# Patient Record
Sex: MALE | Race: WHITE | NOT HISPANIC OR LATINO | Employment: UNEMPLOYED | URBAN - METROPOLITAN AREA
[De-identification: names, ages, dates, MRNs, and addresses within clinical notes are randomized per-mention and may not be internally consistent; named-entity substitution may affect disease eponyms.]

---
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
## 2025-01-13 DIAGNOSIS — G43.019 INTRACTABLE MIGRAINE WITHOUT AURA AND WITHOUT STATUS MIGRAINOSUS: ICD-10-CM

## 2025-01-13 RX ORDER — AMITRIPTYLINE HYDROCHLORIDE 75 MG/1
TABLET ORAL
Qty: 30 TABLET | Refills: 0 | OUTPATIENT
Start: 2025-01-13

## 2025-01-13 NOTE — TELEPHONE ENCOUNTER
Rx Refill Note  Requested Prescriptions     Pending Prescriptions Disp Refills    amitriptyline (ELAVIL) 75 MG tablet [Pharmacy Med Name: AMITRIPTYLINE HCL 75 MG TAB] 30 tablet 0     Sig: TAKE ONE TABLET BY MOUTH EVERY NIGHT **NEED APPOINTMENT FOR FURTHER REFILLS**      Last filled:  12/18/24 w/0  Last office visit with prescribing clinician: 12/28/2023      Next office visit with prescribing clinician: Visit date not found     Nida Canseco MA  01/13/25, 12:30 EST

## 2025-01-15 DIAGNOSIS — G43.019 INTRACTABLE MIGRAINE WITHOUT AURA AND WITHOUT STATUS MIGRAINOSUS: ICD-10-CM

## 2025-01-15 RX ORDER — AMITRIPTYLINE HYDROCHLORIDE 75 MG/1
TABLET ORAL
Qty: 30 TABLET | Refills: 0 | OUTPATIENT
Start: 2025-01-15

## 2025-01-15 NOTE — TELEPHONE ENCOUNTER
Rx Refill Note  Requested Prescriptions     Pending Prescriptions Disp Refills    amitriptyline (ELAVIL) 75 MG tablet [Pharmacy Med Name: AMITRIPTYLINE HCL 75 MG TAB] 30 tablet 0     Sig: TAKE ONE TABLET BY MOUTH EVERY NIGHT **NEED APPOINTMENT FOR FURTHER REFILLS**      Last filled: 12/18/24 w/0 and note to call to schedule for further refills  Last office visit with prescribing clinician: 12/28/2023      Next office visit with prescribing clinician: Visit date not found     Nida Canseco MA  01/15/25, 15:33 EST

## 2025-01-15 NOTE — TELEPHONE ENCOUNTER
Rx Refill Note  Requested Prescriptions     Pending Prescriptions Disp Refills    amitriptyline (ELAVIL) 75 MG tablet [Pharmacy Med Name: AMITRIPTYLINE HCL 75 MG TAB] 30 tablet 0     Sig: TAKE ONE TABLET BY MOUTH EVERY NIGHT **NEED APPOINTMENT FOR FURTHER REFILLS**      Last filled:  12/18/24 w/0 and msg to jessica an appt for further refills  Last office visit with prescribing clinician: 12/28/2023      Next office visit with prescribing clinician: Visit date not found     Nida Canseco MA  01/15/25, 08:54 EST

## 2025-02-12 ENCOUNTER — OFFICE VISIT (OUTPATIENT)
Dept: FAMILY MEDICINE CLINIC | Facility: CLINIC | Age: 20
End: 2025-02-12
Payer: COMMERCIAL

## 2025-02-12 VITALS
HEIGHT: 71 IN | OXYGEN SATURATION: 99 % | HEART RATE: 95 BPM | SYSTOLIC BLOOD PRESSURE: 126 MMHG | DIASTOLIC BLOOD PRESSURE: 80 MMHG | WEIGHT: 292 LBS | BODY MASS INDEX: 40.88 KG/M2

## 2025-02-12 DIAGNOSIS — Z13.0 SCREENING FOR DEFICIENCY ANEMIA: ICD-10-CM

## 2025-02-12 DIAGNOSIS — Z13.89 SCREENING FOR BLOOD OR PROTEIN IN URINE: ICD-10-CM

## 2025-02-12 DIAGNOSIS — E66.01 CLASS 3 SEVERE OBESITY DUE TO EXCESS CALORIES WITH BODY MASS INDEX (BMI) OF 40.0 TO 44.9 IN ADULT, UNSPECIFIED WHETHER SERIOUS COMORBIDITY PRESENT: ICD-10-CM

## 2025-02-12 DIAGNOSIS — G89.29 CHRONIC BILATERAL LOW BACK PAIN WITHOUT SCIATICA: ICD-10-CM

## 2025-02-12 DIAGNOSIS — Z13.220 SCREENING FOR HYPERLIPIDEMIA: ICD-10-CM

## 2025-02-12 DIAGNOSIS — Z02.89 MEDICATION MANAGEMENT CONTRACT AGREEMENT: ICD-10-CM

## 2025-02-12 DIAGNOSIS — E66.813 CLASS 3 SEVERE OBESITY DUE TO EXCESS CALORIES WITH BODY MASS INDEX (BMI) OF 40.0 TO 44.9 IN ADULT, UNSPECIFIED WHETHER SERIOUS COMORBIDITY PRESENT: ICD-10-CM

## 2025-02-12 DIAGNOSIS — M54.50 CHRONIC BILATERAL LOW BACK PAIN WITHOUT SCIATICA: ICD-10-CM

## 2025-02-12 DIAGNOSIS — F51.01 PRIMARY INSOMNIA: Primary | ICD-10-CM

## 2025-02-12 DIAGNOSIS — Z13.6 SCREENING FOR CARDIOVASCULAR CONDITION: ICD-10-CM

## 2025-02-12 DIAGNOSIS — Z13.29 SCREENING FOR ENDOCRINE DISORDER: ICD-10-CM

## 2025-02-12 DIAGNOSIS — Z00.00 PREVENTATIVE HEALTH CARE: ICD-10-CM

## 2025-02-12 RX ORDER — ZOLPIDEM TARTRATE 10 MG/1
10 TABLET ORAL NIGHTLY PRN
Qty: 30 TABLET | Refills: 2 | Status: SHIPPED | OUTPATIENT
Start: 2025-02-12

## 2025-02-12 NOTE — PROGRESS NOTES
Established Patient Office Visit      Patient Name: Kole Sue  : 2005   MRN: [id number]   Care Team: Patient Care Team:  Chuy Oliver DO as PCP - General (Family Medicine)  Provider, No Known as PCP - Family Medicine    Chief Complaint:    Chief Complaint   Patient presents with    Med Management       History of Present Illness: Kole Sue is a 19 y.o. male who is here today for chief complaint.    HPI    Refills on sleeping meds and migraine med    This patient is accompanied by their self who contributes to the history of their care.    The following portions of the patient's history were reviewed and updated as appropriate: allergies, current medications, past family history, past medical history, past social history, past surgical history and problem list.    Subjective      Review of Systems:   Review of Systems - See HPI    Past Medical History:   Past Medical History:   Diagnosis Date    Allergic N/A    Dairy, Tomatoes    Back pain at L4-L5 level     Depression 2018    Headache N/A    Irritable bowel syndrome 2016    Strep throat     Visual impairment     Glasses, Polar bear tracks       Past Surgical History:   Past Surgical History:   Procedure Laterality Date    CIRCUMCISION      COLONOSCOPY         Family History:   Family History   Problem Relation Age of Onset    Anxiety disorder Mother     Depression Mother     Anxiety disorder Maternal Grandmother     Arthritis Maternal Grandmother     Cancer Maternal Grandmother     Depression Maternal Grandmother     Diabetes Maternal Grandmother     Anxiety disorder Sister     Depression Sister     Anxiety disorder Sister     Depression Sister        Social History:   Social History     Socioeconomic History    Marital status: Single   Tobacco Use    Smoking status: Former     Current packs/day: 0.00     Average packs/day: 0.3 packs/day for 1 year (0.3 ttl pk-yrs)     Types: Cigarettes     Quit date: 3/15/2023     Years since quitting:  1.9     Passive exposure: Yes    Smokeless tobacco: Never   Vaping Use    Vaping status: Former    Substances: Nicotine   Substance and Sexual Activity    Alcohol use: Never    Drug use: Never    Sexual activity: Never       Tobacco History:   Social History     Tobacco Use   Smoking Status Former    Current packs/day: 0.00    Average packs/day: 0.3 packs/day for 1 year (0.3 ttl pk-yrs)    Types: Cigarettes    Quit date: 3/15/2023    Years since quittin.9    Passive exposure: Yes   Smokeless Tobacco Never       Medications:   Outpatient Medications Prior to Visit   Medication Sig Dispense Refill    amitriptyline (ELAVIL) 75 MG tablet Take 1 tablet by mouth Every Night. CALL TO SCHEDULE OFFICE VISIT FOR FURTHER REFILLS 30 tablet 0    celecoxib (CeleBREX) 100 MG capsule TAKE 1 CAPSULE BY MOUTH 2 TIMES A  capsule 0    MAGIC MOUTHWASH  SUSP (diphenhydrAMINE HCl-Aluminum & Magnesium Hydroxide-Lidocaine) Swish and spit 10 mL 3 (Three) Times a Day As Needed for Mouth Pain. 250 mL 0    omeprazole (priLOSEC) 40 MG capsule Take 1 capsule by mouth Daily. 90 capsule 3    promethazine (PHENERGAN) 25 MG tablet Take 1 tablet by mouth Every 8 (Eight) Hours As Needed for Nausea or Vomiting. 15 tablet 0    tiZANidine (ZANAFLEX) 4 MG tablet Take 2 tablets by mouth At Night As Needed for Muscle Spasms. 60 tablet 2    zolpidem (AMBIEN) 10 MG tablet TAKE ONE TABLET BY MOUTH ONCE NIGHTLY AS NEEDED FOR SLEEP 30 tablet 2    diazePAM (VALIUM) 10 MG tablet  (Patient not taking: Reported on 2025)      SUMAtriptan (IMITREX) 100 MG tablet Take one tablet at onset of headache. May repeat dose one time in 2 hours if headache not relieved. (Patient not taking: Reported on 2025) 12 tablet 2    albuterol sulfate  (90 Base) MCG/ACT inhaler Inhale 1 puff Every 6 (Six) Hours As Needed for Wheezing. (Patient not taking: Reported on 2025) 6.7 g 0    atomoxetine (STRATTERA) 60 MG capsule TAKE 1 CAPSULE BY MOUTH DAILY .  "START AFTER 40MG. HAVE LABS COMPLETED AFTER 2 MONTHS ON DOSAGE (Patient not taking: Reported on 2/12/2025) 30 capsule 2    azithromycin (Zithromax Z-Shay) 250 MG tablet Take 2 tablets by mouth on day 1, then 1 tablet daily on days 2-5 (Patient not taking: Reported on 2/12/2025) 6 tablet 0    brompheniramine-pseudoephedrine-DM 30-2-10 MG/5ML syrup Take 5 mL by mouth At Night As Needed for Allergies. (Patient not taking: Reported on 2/12/2025) 118 mL 0    predniSONE (DELTASONE) 10 MG (21) dose pack Take  by mouth Daily. Use as directed on package (Patient not taking: Reported on 2/12/2025) 21 each 0     No facility-administered medications prior to visit.        Allergies:   Allergies   Allergen Reactions    Lactose Other (See Comments)    Tomato Other (See Comments)     Canker sores, fever       Objective   Objective     Physical Exam:  Vital Signs:   Vitals:    02/12/25 1210   BP: 126/80   Pulse: 95   SpO2: 99%   Weight: 132 kg (292 lb)   Height: 180.3 cm (70.98\")     Body mass index is 40.74 kg/m².     Physical Exam  Nursing note reviewed  Const: NAD, A&Ox4, Pleasant, Cooperative  Eyes: EOMI, no conjunctivitis  ENT: No nasal discharge present, neck supple  Cardiac: Regular rate and rhythm, no cyanosis  Resp: Respiratory rate within normal limits, no increased work of breathing, no audible wheezing or retractions noted  GI: No distention or ascites  MSK: Motor and sensation grossly intact in bilateral upper extremities  Neurologic: CN II-XII grossly intact  Psych: Appropriate mood and behavior.  Skin: Warm, dry  Procedures/Radiology     Procedures  No radiology results for the last 7 days     Assessment & Plan   Assessment / Plan      Assessment/Plan:   Problems Addressed This Visit  Diagnoses and all orders for this visit:    1. Primary insomnia (Primary)    2. Medication management contract agreement  -     Compliance Drug Analysis, Ur - Urine, Clean Catch; Future  -     Compliance Drug Analysis, Ur - Urine, Clean " Catch      Problem List Items Addressed This Visit    None  Visit Diagnoses       Primary insomnia    -  Primary    Medication management contract agreement        Relevant Orders    Compliance Drug Analysis, Ur - Urine, Clean Catch            There are no Patient Instructions on file for this visit.    Follow Up:   No follow-ups on file.    DO KIERSTEN Parnell RD  [facility]  [address]  [facility] [address]  Fax [phone]  Phone [phone]    Disclaimer to patients: The 21st Century Cares Act makes medical notes like these available to patients in the interest of transparency. However, please be advised that this is still a medical document. It is intended as [id number] communication. Many sections may include medical language or jargon, abbreviations, and additional verbiage that are unfamiliar or confusing. In some ways it may come across as blunt, direct, or may be summarized in order to clearly and concisely communicate the most crucial information to medical professionals. It may also include mentions of conditions that are unlikely but considered as part of the differential diagnosis, including serious disorders. These are not always discussed at length at the time of appointment because their likelihood is so low, but may be included in a medical note to make it clear what has been considered and/or ruled out as part of a work-up. Medical documents are intended to carry relevant information, facts as evident, and the personal clinical opinion of the physician. If you have any questions regarding this medical document, please bring them to the attention of the physician at your next scheduled appointment.

## 2025-02-12 NOTE — PATIENT INSTRUCTIONS
1.  Continue medications and supplements - as listed.    2.  Continue well-balanced diet - low in calories and low in added sugar.  -Plant-based diets have the best evidence for decreasing inflammation and chronic pain, as well as reducing the risk of heart disease and dementia.    3.  Maintain a routine exercise program - 30 minutes at least 3 days every week.  This is important even if you are active at your job.

## 2025-02-16 DIAGNOSIS — M54.50 CHRONIC BILATERAL LOW BACK PAIN WITHOUT SCIATICA: ICD-10-CM

## 2025-02-16 DIAGNOSIS — G89.29 CHRONIC BILATERAL LOW BACK PAIN WITHOUT SCIATICA: ICD-10-CM

## 2025-02-17 RX ORDER — CELECOXIB 100 MG/1
100 CAPSULE ORAL 2 TIMES DAILY
Qty: 180 CAPSULE | Refills: 0 | Status: SHIPPED | OUTPATIENT
Start: 2025-02-17

## 2025-02-18 LAB — DRUGS UR: NORMAL

## 2025-03-13 DIAGNOSIS — G43.019 INTRACTABLE MIGRAINE WITHOUT AURA AND WITHOUT STATUS MIGRAINOSUS: ICD-10-CM

## 2025-03-13 RX ORDER — AMITRIPTYLINE HYDROCHLORIDE 75 MG/1
75 TABLET ORAL NIGHTLY
Qty: 30 TABLET | Refills: 0 | OUTPATIENT
Start: 2025-03-13

## 2025-03-13 NOTE — TELEPHONE ENCOUNTER
Rx Refill Note  Requested Prescriptions     Pending Prescriptions Disp Refills    amitriptyline (ELAVIL) 75 MG tablet 30 tablet 0     Sig: Take 1 tablet by mouth Every Night. CALL TO SCHEDULE OFFICE VISIT FOR FURTHER REFILLS      Last filled: 12/18/24 w/0  Last office visit with prescribing clinician: 12/28/2023      Next office visit with prescribing clinician: Visit date not found     Nida Canseco MA  03/13/25, 12:22 EDT

## 2025-03-18 ENCOUNTER — OFFICE VISIT (OUTPATIENT)
Dept: FAMILY MEDICINE CLINIC | Facility: CLINIC | Age: 20
End: 2025-03-18
Payer: COMMERCIAL

## 2025-03-18 VITALS
BODY MASS INDEX: 40.18 KG/M2 | HEIGHT: 71 IN | OXYGEN SATURATION: 97 % | DIASTOLIC BLOOD PRESSURE: 78 MMHG | SYSTOLIC BLOOD PRESSURE: 114 MMHG | HEART RATE: 102 BPM | WEIGHT: 287 LBS

## 2025-03-18 DIAGNOSIS — G43.019 INTRACTABLE MIGRAINE WITHOUT AURA AND WITHOUT STATUS MIGRAINOSUS: ICD-10-CM

## 2025-03-18 RX ORDER — AMITRIPTYLINE HYDROCHLORIDE 75 MG/1
75 TABLET ORAL NIGHTLY
Qty: 90 TABLET | Refills: 2 | Status: SHIPPED | OUTPATIENT
Start: 2025-03-18

## 2025-03-18 NOTE — LETTER
March 18, 2025     Patient: Kole Sue   YOB: 2005   Date of Visit: 3/18/2025       To Whom It May Concern:    It is my medical opinion that Kole Sue may return to work today without restrictions.         Sincerely,        Chuy Oliver DO    CC: No Recipients

## 2025-03-28 DIAGNOSIS — F51.01 PRIMARY INSOMNIA: ICD-10-CM

## 2025-03-28 RX ORDER — ZOLPIDEM TARTRATE 10 MG/1
10 TABLET ORAL NIGHTLY PRN
Qty: 30 TABLET | Refills: 2 | Status: SHIPPED | OUTPATIENT
Start: 2025-03-28

## 2025-03-28 NOTE — TELEPHONE ENCOUNTER
Rx Refill Note    Zolpidem, refill    Last office visit with prescribing clinician: 3/18/2025   Last telemedicine visit with prescribing clinician: Visit date not found   Next office visit with prescribing clinician: 5/12/2025     Cha Alfaro MA  03/28/25, 09:04 EDT

## 2025-04-01 NOTE — PROGRESS NOTES
Established Patient Office Visit      Patient Name: Kole Sue  : 2005   MRN: [id number]   Care Team: Patient Care Team:  Chuy Oliver DO as PCP - General (Family Medicine)  Provider, No Known as PCP - Family Medicine    Chief Complaint:    Chief Complaint   Patient presents with    Nausea     Pt states he felt like he had stomach flu like symptoms last Thursday. He called into work Friday and they stated he would need a note       History of Present Illness: Kole Sue is a 19 y.o. male who is here today for chief complaint.    HPI    Just needs a note saying he can return to work    This patient is accompanied by their self who contributes to the history of their care.    The following portions of the patient's history were reviewed and updated as appropriate: allergies, current medications, past family history, past medical history, past social history, past surgical history and problem list.    Subjective      Review of Systems:   Review of Systems - See HPI    Past Medical History:   Past Medical History:   Diagnosis Date    Allergic N/A    Dairy, Tomatoes    Back pain at L4-L5 level     Depression 2018    Headache N/A    Irritable bowel syndrome 2016    Strep throat     Visual impairment     Glasses, Polar bear tracks       Past Surgical History:   Past Surgical History:   Procedure Laterality Date    CIRCUMCISION      COLONOSCOPY         Family History:   Family History   Problem Relation Age of Onset    Anxiety disorder Mother     Depression Mother     Anxiety disorder Maternal Grandmother     Arthritis Maternal Grandmother     Cancer Maternal Grandmother     Depression Maternal Grandmother     Diabetes Maternal Grandmother     Anxiety disorder Sister     Depression Sister     Anxiety disorder Sister     Depression Sister        Social History:   Social History     Socioeconomic History    Marital status: Single   Tobacco Use    Smoking status: Former     Current packs/day: 0.00      Average packs/day: 0.3 packs/day for 1 year (0.3 ttl pk-yrs)     Types: Cigarettes     Quit date: 3/15/2023     Years since quittin.0     Passive exposure: Yes    Smokeless tobacco: Never   Vaping Use    Vaping status: Former    Substances: Nicotine   Substance and Sexual Activity    Alcohol use: Never    Drug use: Never    Sexual activity: Never       Tobacco History:   Social History     Tobacco Use   Smoking Status Former    Current packs/day: 0.00    Average packs/day: 0.3 packs/day for 1 year (0.3 ttl pk-yrs)    Types: Cigarettes    Quit date: 3/15/2023    Years since quittin.0    Passive exposure: Yes   Smokeless Tobacco Never       Medications:   Outpatient Medications Prior to Visit   Medication Sig Dispense Refill    celecoxib (CeleBREX) 100 MG capsule TAKE 1 CAPSULE BY MOUTH 2 TIMES A  capsule 0    MAGIC MOUTHWASH  SUSP (diphenhydrAMINE HCl-Aluminum & Magnesium Hydroxide-Lidocaine) Swish and spit 10 mL 3 (Three) Times a Day As Needed for Mouth Pain. 250 mL 0    omeprazole (priLOSEC) 40 MG capsule Take 1 capsule by mouth Daily. 90 capsule 3    promethazine (PHENERGAN) 25 MG tablet Take 1 tablet by mouth Every 8 (Eight) Hours As Needed for Nausea or Vomiting. 15 tablet 0    tiZANidine (ZANAFLEX) 4 MG tablet Take 2 tablets by mouth At Night As Needed for Muscle Spasms. 60 tablet 2    zolpidem (AMBIEN) 10 MG tablet Take 1 tablet by mouth At Night As Needed for Sleep. 30 tablet 2    SUMAtriptan (IMITREX) 100 MG tablet Take one tablet at onset of headache. May repeat dose one time in 2 hours if headache not relieved. (Patient not taking: Reported on 3/18/2025) 12 tablet 2    amitriptyline (ELAVIL) 75 MG tablet Take 1 tablet by mouth Every Night. CALL TO SCHEDULE OFFICE VISIT FOR FURTHER REFILLS (Patient not taking: Reported on 3/18/2025) 30 tablet 0     No facility-administered medications prior to visit.        Allergies:   Allergies   Allergen Reactions    Lactose Other (See Comments)     "Tomato Other (See Comments)     Canker sores, fever       Objective   Objective     Physical Exam:  Vital Signs:   Vitals:    03/18/25 1045   BP: 114/78   Pulse: 102   SpO2: 97%   Weight: 130 kg (287 lb)   Height: 180.3 cm (70.98\")     Body mass index is 40.05 kg/m².     Physical Exam  Nursing note reviewed  Const: NAD, A&Ox4, Pleasant, Cooperative  Eyes: EOMI, no conjunctivitis  ENT: No nasal discharge present, neck supple  Cardiac: Regular rate and rhythm, no cyanosis  Resp: Respiratory rate within normal limits, no increased work of breathing, no audible wheezing or retractions noted  GI: No distention or ascites  MSK: Motor and sensation grossly intact in bilateral upper extremities  Neurologic: CN II-XII grossly intact  Psych: Appropriate mood and behavior.  Skin: Warm, dry  Procedures/Radiology     Procedures  No radiology results for the last 7 days     Assessment & Plan   Assessment / Plan      Assessment/Plan:   Problems Addressed This Visit  Diagnoses and all orders for this visit:    1. Intractable migraine without aura and without status migrainosus  -     amitriptyline (ELAVIL) 75 MG tablet; Take 1 tablet by mouth Every Night.  Dispense: 90 tablet; Refill: 2      Problem List Items Addressed This Visit          Neuro    Intractable migraine without aura and without status migrainosus    Relevant Medications    amitriptyline (ELAVIL) 75 MG tablet       There are no Patient Instructions on file for this visit.    Follow Up:   No follow-ups on file.        DO KIERSTEN Parnell RD  [facility]  [address] [facility] [address]  Fax [phone]  Phone [phone]    Disclaimer to patients: The 21st Century Cares Act makes medical notes like these available to patients in the interest of transparency. However, please be advised that this is still a medical document. It is intended as [id number]-vp-kaip communication. Many sections may " include medical language or jargon, abbreviations, and additional verbiage that are unfamiliar or confusing. In some ways it may come across as blunt, direct, or may be summarized in order to clearly and concisely communicate the most crucial information to medical professionals. It may also include mentions of conditions that are unlikely but considered as part of the differential diagnosis, including serious disorders. These are not always discussed at length at the time of appointment because their likelihood is so low, but may be included in a medical note to make it clear what has been considered and/or ruled out as part of a work-up. Medical documents are intended to carry relevant information, facts as evident, and the personal clinical opinion of the physician. If you have any questions regarding this medical document, please bring them to the attention of the physician at your next scheduled appointment.

## 2025-04-30 ENCOUNTER — HOSPITAL ENCOUNTER (EMERGENCY)
Facility: HOSPITAL | Age: 20
Discharge: HOME | End: 2025-04-30
Attending: STUDENT IN AN ORGANIZED HEALTH CARE EDUCATION/TRAINING PROGRAM
Payer: COMMERCIAL

## 2025-04-30 ENCOUNTER — APPOINTMENT (OUTPATIENT)
Facility: HOSPITAL | Age: 20
End: 2025-04-30
Payer: COMMERCIAL

## 2025-04-30 VITALS
DIASTOLIC BLOOD PRESSURE: 93 MMHG | SYSTOLIC BLOOD PRESSURE: 143 MMHG | TEMPERATURE: 98.5 F | HEIGHT: 71 IN | BODY MASS INDEX: 40.18 KG/M2 | RESPIRATION RATE: 16 BRPM | OXYGEN SATURATION: 98 % | WEIGHT: 287 LBS | HEART RATE: 108 BPM

## 2025-04-30 DIAGNOSIS — R07.9 CHEST PAIN, UNSPECIFIED TYPE: ICD-10-CM

## 2025-04-30 DIAGNOSIS — F41.9 ANXIETY: Primary | ICD-10-CM

## 2025-04-30 DIAGNOSIS — R06.02 SHORTNESS OF BREATH: ICD-10-CM

## 2025-04-30 LAB
ALBUMIN SERPL-MCNC: 4.3 G/DL (ref 3.5–5.2)
ALBUMIN/GLOB SERPL: 1.4 G/DL
ALP SERPL-CCNC: 134 U/L (ref 39–117)
ALT SERPL W P-5'-P-CCNC: 32 U/L (ref 1–41)
ANION GAP SERPL CALCULATED.3IONS-SCNC: 13.8 MMOL/L (ref 5–15)
AST SERPL-CCNC: 23 U/L (ref 1–40)
BASOPHILS # BLD AUTO: 0.05 10*3/MM3 (ref 0–0.2)
BASOPHILS NFR BLD AUTO: 0.5 % (ref 0–1.5)
BILIRUB SERPL-MCNC: 0.4 MG/DL (ref 0–1.2)
BUN SERPL-MCNC: 9 MG/DL (ref 6–20)
BUN/CREAT SERPL: 9.3 (ref 7–25)
CALCIUM SPEC-SCNC: 9.4 MG/DL (ref 8.6–10.5)
CHLORIDE SERPL-SCNC: 105 MMOL/L (ref 98–107)
CO2 SERPL-SCNC: 21.2 MMOL/L (ref 22–29)
CREAT SERPL-MCNC: 0.97 MG/DL (ref 0.76–1.27)
DEPRECATED RDW RBC AUTO: 37.7 FL (ref 37–54)
EGFRCR SERPLBLD CKD-EPI 2021: 115.3 ML/MIN/1.73
EOSINOPHIL # BLD AUTO: 0.05 10*3/MM3 (ref 0–0.4)
EOSINOPHIL NFR BLD AUTO: 0.5 % (ref 0.3–6.2)
ERYTHROCYTE [DISTWIDTH] IN BLOOD BY AUTOMATED COUNT: 11.9 % (ref 12.3–15.4)
GLOBULIN UR ELPH-MCNC: 3 GM/DL
GLUCOSE SERPL-MCNC: 104 MG/DL (ref 65–99)
HCT VFR BLD AUTO: 43.4 % (ref 37.5–51)
HGB BLD-MCNC: 15.2 G/DL (ref 13–17.7)
IMM GRANULOCYTES # BLD AUTO: 0.09 10*3/MM3 (ref 0–0.05)
IMM GRANULOCYTES NFR BLD AUTO: 0.8 % (ref 0–0.5)
LYMPHOCYTES # BLD AUTO: 2.05 10*3/MM3 (ref 0.7–3.1)
LYMPHOCYTES NFR BLD AUTO: 18.9 % (ref 19.6–45.3)
MCH RBC QN AUTO: 29.9 PG (ref 26.6–33)
MCHC RBC AUTO-ENTMCNC: 35 G/DL (ref 31.5–35.7)
MCV RBC AUTO: 85.4 FL (ref 79–97)
MONOCYTES # BLD AUTO: 0.81 10*3/MM3 (ref 0.1–0.9)
MONOCYTES NFR BLD AUTO: 7.5 % (ref 5–12)
NEUTROPHILS NFR BLD AUTO: 7.81 10*3/MM3 (ref 1.7–7)
NEUTROPHILS NFR BLD AUTO: 71.8 % (ref 42.7–76)
NT-PROBNP SERPL-MCNC: <36 PG/ML (ref 0–450)
PLATELET # BLD AUTO: 286 10*3/MM3 (ref 140–450)
PMV BLD AUTO: 11.5 FL (ref 6–12)
POTASSIUM SERPL-SCNC: 4.1 MMOL/L (ref 3.5–5.2)
PROT SERPL-MCNC: 7.3 G/DL (ref 6–8.5)
RBC # BLD AUTO: 5.08 10*6/MM3 (ref 4.14–5.8)
SODIUM SERPL-SCNC: 140 MMOL/L (ref 136–145)
TROPONIN T SERPL HS-MCNC: <6 NG/L
WBC NRBC COR # BLD AUTO: 10.86 10*3/MM3 (ref 3.4–10.8)

## 2025-04-30 PROCEDURE — 83880 ASSAY OF NATRIURETIC PEPTIDE: CPT | Performed by: PHYSICIAN ASSISTANT

## 2025-04-30 PROCEDURE — 80053 COMPREHEN METABOLIC PANEL: CPT | Performed by: PHYSICIAN ASSISTANT

## 2025-04-30 PROCEDURE — 99285 EMERGENCY DEPT VISIT HI MDM: CPT | Performed by: STUDENT IN AN ORGANIZED HEALTH CARE EDUCATION/TRAINING PROGRAM

## 2025-04-30 PROCEDURE — 25510000001 IOPAMIDOL PER 1 ML: Performed by: STUDENT IN AN ORGANIZED HEALTH CARE EDUCATION/TRAINING PROGRAM

## 2025-04-30 PROCEDURE — 36415 COLL VENOUS BLD VENIPUNCTURE: CPT

## 2025-04-30 PROCEDURE — 93005 ELECTROCARDIOGRAM TRACING: CPT | Performed by: PHYSICIAN ASSISTANT

## 2025-04-30 PROCEDURE — 84484 ASSAY OF TROPONIN QUANT: CPT | Performed by: PHYSICIAN ASSISTANT

## 2025-04-30 PROCEDURE — 85025 COMPLETE CBC W/AUTO DIFF WBC: CPT | Performed by: PHYSICIAN ASSISTANT

## 2025-04-30 PROCEDURE — 71275 CT ANGIOGRAPHY CHEST: CPT

## 2025-04-30 RX ORDER — HYDROXYZINE HYDROCHLORIDE 25 MG/1
25 TABLET, FILM COATED ORAL EVERY 8 HOURS PRN
Qty: 10 TABLET | Refills: 0 | Status: SHIPPED | OUTPATIENT
Start: 2025-04-30

## 2025-04-30 RX ORDER — IOPAMIDOL 755 MG/ML
100 INJECTION, SOLUTION INTRAVASCULAR
Status: COMPLETED | OUTPATIENT
Start: 2025-04-30 | End: 2025-04-30

## 2025-04-30 RX ORDER — SODIUM CHLORIDE 0.9 % (FLUSH) 0.9 %
10 SYRINGE (ML) INJECTION AS NEEDED
Status: DISCONTINUED | OUTPATIENT
Start: 2025-04-30 | End: 2025-04-30 | Stop reason: HOSPADM

## 2025-04-30 RX ORDER — ALUMINA, MAGNESIA, AND SIMETHICONE 2400; 2400; 240 MG/30ML; MG/30ML; MG/30ML
15 SUSPENSION ORAL ONCE
Status: COMPLETED | OUTPATIENT
Start: 2025-04-30 | End: 2025-04-30

## 2025-04-30 RX ORDER — HYDROXYZINE HYDROCHLORIDE 25 MG/1
25 TABLET, FILM COATED ORAL ONCE
Status: COMPLETED | OUTPATIENT
Start: 2025-04-30 | End: 2025-04-30

## 2025-04-30 RX ORDER — LIDOCAINE HYDROCHLORIDE 20 MG/ML
10 SOLUTION OROPHARYNGEAL ONCE
Status: COMPLETED | OUTPATIENT
Start: 2025-04-30 | End: 2025-04-30

## 2025-04-30 RX ADMIN — HYDROXYZINE HYDROCHLORIDE 25 MG: 25 TABLET, FILM COATED ORAL at 17:54

## 2025-04-30 RX ADMIN — LIDOCAINE HYDROCHLORIDE 10 ML: 20 SOLUTION ORAL at 18:52

## 2025-04-30 RX ADMIN — IOPAMIDOL 170 ML: 755 INJECTION, SOLUTION INTRAVENOUS at 17:47

## 2025-04-30 RX ADMIN — ALUMINUM HYDROXIDE, MAGNESIUM HYDROXIDE, AND DIMETHICONE 15 ML: 400; 400; 40 SUSPENSION ORAL at 18:52

## 2025-04-30 NOTE — FSED PROVIDER NOTE
"Subjective  History of Present Illness:    19-year-old male presents to the ED for evaluation of shortness of breath x 4 days.  Patient states since Saturday he has had some shortness of breath and feels a \"bubble\" in his chest.  He is not having any difficulty swallowing and has been eating and drinking without any complication.  States that he has been increasingly anxious and has been having some panic attacks relative to shortness of breath.  He was evaluated at urgent care prior to arrival and urged to come to ED to \"rule out a blood clot in the lung\".  He has not any recent surgeries or immobilizations.  Is not on any oral hormones.  No history of malignancy.  Patient had EKG and chest x-ray at the urgent care, mom has a copy of the EKG with her.    Nurses Notes reviewed and agree, including vitals, allergies, social history and prior medical history.     REVIEW OF SYSTEMS: All systems reviewed and not pertinent unless noted.  Review of Systems   Respiratory:  Positive for shortness of breath.    Cardiovascular:  Positive for chest pain.   Psychiatric/Behavioral:  The patient is nervous/anxious.    All other systems reviewed and are negative.      Past Medical History:   Diagnosis Date    Allergic N/A    Dairy, Tomatoes    Back pain at L4-L5 level     Depression 2018    Headache N/A    Irritable bowel syndrome 2016    Strep throat     Visual impairment     Glasses, Polar bear tracks       Allergies:    Lactose and Tomato      Past Surgical History:   Procedure Laterality Date    CIRCUMCISION      COLONOSCOPY           Social History     Socioeconomic History    Marital status: Single   Tobacco Use    Smoking status: Former     Current packs/day: 0.00     Average packs/day: 0.3 packs/day for 1 year (0.3 ttl pk-yrs)     Types: Cigarettes     Quit date: 3/15/2023     Years since quittin.1     Passive exposure: Yes    Smokeless tobacco: Never   Vaping Use    Vaping status: Former    Substances: Nicotine " "  Substance and Sexual Activity    Alcohol use: Never    Drug use: Never    Sexual activity: Never         Family History   Problem Relation Age of Onset    Anxiety disorder Mother     Depression Mother     Anxiety disorder Maternal Grandmother     Arthritis Maternal Grandmother     Cancer Maternal Grandmother     Depression Maternal Grandmother     Diabetes Maternal Grandmother     Anxiety disorder Sister     Depression Sister     Anxiety disorder Sister     Depression Sister        Objective  Physical Exam:  /93   Pulse 108   Temp 98.5 °F (36.9 °C)   Resp 16   Ht 180 cm (70.87\")   Wt 130 kg (287 lb)   SpO2 98%   BMI 40.18 kg/m²      Physical Exam  Vitals and nursing note reviewed.   Constitutional:       General: He is not in acute distress.  HENT:      Head: Normocephalic and atraumatic.   Cardiovascular:      Rate and Rhythm: Regular rhythm. Tachycardia present.   Pulmonary:      Effort: Pulmonary effort is normal. No respiratory distress.      Breath sounds: Normal breath sounds.   Skin:     General: Skin is warm and dry.   Neurological:      Mental Status: He is alert.      Comments: Awake and alert   Psychiatric:         Mood and Affect: Mood is anxious.         Behavior: Behavior normal.         Procedures    ED Course:         Lab Results (last 24 hours)       Procedure Component Value Units Date/Time    CBC & Differential [[id number]]  (Abnormal) Collected: 04/30/25 1728    Specimen: Blood Updated: 04/30/25 1806    Narrative:      The following orders were created for panel order CBC & Differential.  Procedure                               Abnormality         Status                     ---------                               -----------         ------                     CBC Auto Differential[[id number]]        Abnormal            Final result                 Please view results for these tests on the individual orders.    Comprehensive Metabolic Panel [[id number]]  (Abnormal) Collected: 04/30/25 " 1728    Specimen: Blood Updated: 04/30/25 1829     Glucose 104 mg/dL      BUN 9 mg/dL      Creatinine 0.97 mg/dL      Sodium 140 mmol/L      Potassium 4.1 mmol/L      Comment: Specimen hemolyzed.  Result may be falsely elevated.        Chloride 105 mmol/L      CO2 21.2 mmol/L      Calcium 9.4 mg/dL      Total Protein 7.3 g/dL      Albumin 4.3 g/dL      ALT (SGPT) 32 U/L      AST (SGOT) 23 U/L      Alkaline Phosphatase 134 U/L      Total Bilirubin 0.4 mg/dL      Globulin 3.0 gm/dL      A/G Ratio 1.4 g/dL      BUN/Creatinine Ratio 9.3     Anion Gap 13.8 mmol/L      eGFR 115.3 mL/min/1.73     Narrative:      GFR Categories in Chronic Kidney Disease (CKD)              GFR Category          GFR (mL/min/1.73)    Interpretation  G1                    90 or greater        Normal or high (1)  G2                    60-89                Mild decrease (1)  G3a                   45-59                Mild to moderate decrease  G3b                   30-44                Moderate to severe decrease  G4                    15-29                Severe decrease  G5                    14 or less           Kidney failure    (1)In the absence of evidence of kidney disease, neither GFR category G1 or G2 fulfill the criteria for CKD.    eGFR calculation 2021 CKD-EPI creatinine equation, which does not include race as a factor    High Sensitivity Troponin T [[id number]]  (Normal) Collected: 04/30/25 1728    Specimen: Blood Updated: 04/30/25 1819     HS Troponin T <6 ng/L     BNP [[id number]]  (Normal) Collected: 04/30/25 1728    Specimen: Blood Updated: 04/30/25 1820     proBNP <36.0 pg/mL     Narrative:      This assay is used as an aid in the diagnosis of individuals suspected of having heart failure. It can be used as an aid in the diagnosis of acute decompensated heart failure (ADHF) in patients presenting with signs and symptoms of ADHF to the emergency department (ED). In addition, NT-proBNP of <300 pg/mL indicates ADHF is not  likely.    Age Range Result Interpretation  NT-proBNP Concentration (pg/mL:      <50             Positive            >450                   Gray                 300-450                    Negative             <300    50-75           Positive            >900                  Gray                300-900                  Negative            <300      >75             Positive            >1800                  Gray                300-1800                  Negative            <300    CBC Auto Differential [[id number]]  (Abnormal) Collected: 04/30/25 1728    Specimen: Blood Updated: 04/30/25 1806     WBC 10.86 10*3/mm3      RBC 5.08 10*6/mm3      Hemoglobin 15.2 g/dL      Hematocrit 43.4 %      MCV 85.4 fL      MCH 29.9 pg      MCHC 35.0 g/dL      RDW 11.9 %      RDW-SD 37.7 fl      MPV 11.5 fL      Platelets 286 10*3/mm3      Neutrophil % 71.8 %      Lymphocyte % 18.9 %      Monocyte % 7.5 %      Eosinophil % 0.5 %      Basophil % 0.5 %      Immature Grans % 0.8 %      Neutrophils, Absolute 7.81 10*3/mm3      Lymphocytes, Absolute 2.05 10*3/mm3      Monocytes, Absolute 0.81 10*3/mm3      Eosinophils, Absolute 0.05 10*3/mm3      Basophils, Absolute 0.05 10*3/mm3      Immature Grans, Absolute 0.09 10*3/mm3              CT Angiogram Chest Pulmonary Embolism  Result Date: 4/30/2025  CT ANGIOGRAM CHEST PULMONARY EMBOLISM Date of Exam: 4/30/2025 5:34 PM EDT Indication: shortness of breath. Comparison: None available. Technique: Axial CT images were obtained of the chest after the uneventful intravenous administration of 170 cc Isovue-370 IV contrast utilizing pulmonary embolism protocol.  In addition, a 3-D volume rendered image was created for interpretation.  Reconstructed coronal and sagittal images were also obtained. Automated exposure control and iterative construction methods were used. Findings: The thyroid gland is normal. The subglottic airway is clear. No evidence of aortic dissection. No pulmonary embolus. No  "consolidation. No pneumothorax or pleural effusion. No adenopathy. The heart and pericardium are normal. The visualized upper abdomen is normal. Thoracic vertebral body height and alignment are normal. The sternum is intact. No rib fractures are seen.     Impression: No acute cardiopulmonary disease. No rib fractures Electronically Signed: August Durbin MD  4/30/2025 6:01 PM EDT  Workstation ID: [id number]    XR Chest 2 View  Result Date: 4/30/2025  XR CHEST 2 VW Date of Exam: 4/30/2025 3:27 PM EDT Indication: Shortness of breath and chest pain Comparison: None available. Findings: The heart size is normal.  The pulmonary vascular markings are normal.  The lungs and pleural are clear.  The bony thorax is normal for age.     Impression: Impression: No active disease. Electronically Signed: Hans Segovia MD  4/30/2025 3:48 PM EDT  Workstation ID: [id number]         MDM      Initial impression of presenting illness: 19-year-old male presents to ED for evaluation of shortness of breath, \"chest\" and anxiety.  Evaluated urgent care and sent to ED to rule out pulmonary embolism.  I did review patient's urgent care chart and reviewed the EKG performed.  The provider reported S1Q3T3 upon the EKG.  Patient does have S1, Q3, no T wave inversion appreciated to lead III although the T wave is flattened.  Will move forward with cardiopulmonary evaluation.    DDX: includes but is not limited to: Anxiety, pulmonary embolism, pleurisy,    Pertinent features from physical exam: Patient is anxious.  Mildly tachycardic.  Normal breath sounds throughout.    Initial diagnostic plan: Cardiopulmonary evaluation    Results from initial plan were reviewed and workup is benign.  No evidence of ischemia upon EKG.  Again he does have an S1 and Q3 but no T wave inversion to lead III.  His troponin is undetectable.  CTA performed without evidence of pulmonary embolism or other acute cardiopulmonary findings.  Findings discussed with patient and his " "mother at bedside.  Patient does have some improvement in his anxiety following the hydroxyzine.  Will prescribe short course of hydroxyzine to take as needed for anxiety.  Discussed importance of discussing preventative anxiety medication with PCP at appointment scheduled for tomorrow.  He continues to planing of a \"bubble in throat\" so did administer GI cocktail prior to discharge.    Interventions: Medications administered as below    Medications   sodium chloride 0.9 % flush 10 mL (has no administration in time range)   aluminum-magnesium hydroxide-simethicone (MAALOX MAX) 400-400-40 MG/5ML suspension 15 mL (has no administration in time range)   Lidocaine Viscous HCl (XYLOCAINE) 2 % solution 10 mL (has no administration in time range)   hydrOXYzine (ATARAX) tablet 25 mg (25 mg Oral Given 4/30/25 3354)   iopamidol (ISOVUE-370) 76 % injection 100 mL (170 mL Intravenous Given 4/30/25 1747)     -----  ED Disposition       ED Disposition   Discharge    Condition   Stable    Comment   --             Final diagnoses:   Anxiety   Chest pain, unspecified type   Shortness of breath      Your Follow-Up Providers       Go to  Chuy Oliver DO.    Specialties: Family Medicine, Urgent Care, Emergency Medicine  Follow up details: Tomorrow as scheduled  [address] [id number] [phone]                       Contact information for after-discharge care    Follow-up information has not been specified.                    Your medication list        START taking these medications        Instructions Last Dose Given Next Dose Due   hydrOXYzine 25 MG tablet  Commonly known as: ATARAX      Take 1 tablet by mouth Every 8 (Eight) Hours As Needed for Anxiety.              CONTINUE taking these medications        Instructions Last Dose Given Next Dose Due   amitriptyline 75 MG tablet  Commonly known as: ELAVIL      Take 1 tablet by mouth Every Night.       celecoxib 100 MG capsule  Commonly known as: " CeleBREX      TAKE 1 CAPSULE BY MOUTH 2 TIMES A DAY       MAGIC MOUTHWASH 1/1/1 SUSP suspension suspension      Swish and spit 10 mL 3 (Three) Times a Day As Needed for Mouth Pain.       omeprazole 40 MG capsule  Commonly known as: priLOSEC      Take 1 capsule by mouth Daily.       promethazine 25 MG tablet  Commonly known as: PHENERGAN      Take 1 tablet by mouth Every 8 (Eight) Hours As Needed for Nausea or Vomiting.       SUMAtriptan 100 MG tablet  Commonly known as: IMITREX      Take one tablet at onset of headache. May repeat dose one time in 2 hours if headache not relieved.       tiZANidine 4 MG tablet  Commonly known as: ZANAFLEX      Take 2 tablets by mouth At Night As Needed for Muscle Spasms.       zolpidem 10 MG tablet  Commonly known as: AMBIEN      Take 1 tablet by mouth At Night As Needed for Sleep.                 Where to Get Your Medications        These medications were sent to [facility] [id number] - [place], [place] - [address] - [phone]  - [phone] FX  [address] [facility] [address]      Phone: [phone]   hydrOXYzine 25 MG tablet

## 2025-04-30 NOTE — DISCHARGE INSTRUCTIONS
Take hydroxyzine as prescribed for anxiety.  Please follow-up tomorrow with your PCP as scheduled.  Please return to ED if worsening symptoms or concerns.

## 2025-05-01 ENCOUNTER — LAB (OUTPATIENT)
Dept: LAB | Facility: HOSPITAL | Age: 20
End: 2025-05-01
Payer: COMMERCIAL

## 2025-05-01 ENCOUNTER — OFFICE VISIT (OUTPATIENT)
Dept: FAMILY MEDICINE CLINIC | Facility: CLINIC | Age: 20
End: 2025-05-01
Payer: COMMERCIAL

## 2025-05-01 VITALS
BODY MASS INDEX: 40.21 KG/M2 | HEIGHT: 71 IN | OXYGEN SATURATION: 97 % | HEART RATE: 100 BPM | WEIGHT: 287.2 LBS | DIASTOLIC BLOOD PRESSURE: 86 MMHG | SYSTOLIC BLOOD PRESSURE: 128 MMHG

## 2025-05-01 DIAGNOSIS — Z13.29 SCREENING FOR ENDOCRINE DISORDER: ICD-10-CM

## 2025-05-01 DIAGNOSIS — Z13.6 SCREENING FOR CARDIOVASCULAR CONDITION: ICD-10-CM

## 2025-05-01 DIAGNOSIS — Z13.89 SCREENING FOR BLOOD OR PROTEIN IN URINE: ICD-10-CM

## 2025-05-01 DIAGNOSIS — F41.9 ANXIETY: Primary | ICD-10-CM

## 2025-05-01 DIAGNOSIS — Z00.00 PREVENTATIVE HEALTH CARE: ICD-10-CM

## 2025-05-01 DIAGNOSIS — Z13.220 SCREENING FOR HYPERLIPIDEMIA: ICD-10-CM

## 2025-05-01 DIAGNOSIS — Z13.0 SCREENING FOR DEFICIENCY ANEMIA: ICD-10-CM

## 2025-05-01 LAB
ALBUMIN SERPL-MCNC: 4.4 G/DL (ref 3.5–5.2)
ALBUMIN/GLOB SERPL: 1.4 G/DL
ALP SERPL-CCNC: 119 U/L (ref 39–117)
ALT SERPL W P-5'-P-CCNC: 38 U/L (ref 1–41)
ANION GAP SERPL CALCULATED.3IONS-SCNC: 6.4 MMOL/L (ref 5–15)
AST SERPL-CCNC: 26 U/L (ref 1–40)
BASOPHILS # BLD AUTO: 0.09 10*3/MM3 (ref 0–0.2)
BASOPHILS NFR BLD AUTO: 1 % (ref 0–1.5)
BILIRUB SERPL-MCNC: 0.8 MG/DL (ref 0–1.2)
BILIRUB UR QL STRIP: NEGATIVE
BUN SERPL-MCNC: 7 MG/DL (ref 6–20)
BUN/CREAT SERPL: 6.4 (ref 7–25)
CALCIUM SPEC-SCNC: 9.7 MG/DL (ref 8.6–10.5)
CHLORIDE SERPL-SCNC: 105 MMOL/L (ref 98–107)
CHOLEST SERPL-MCNC: 204 MG/DL (ref 0–200)
CLARITY UR: ABNORMAL
CO2 SERPL-SCNC: 24.6 MMOL/L (ref 22–29)
COLOR UR: YELLOW
CREAT SERPL-MCNC: 1.09 MG/DL (ref 0.76–1.27)
CRP SERPL-MCNC: 0.42 MG/DL (ref 0.01–0.5)
DEPRECATED RDW RBC AUTO: 40.5 FL (ref 37–54)
EGFRCR SERPLBLD CKD-EPI 2021: 100.3 ML/MIN/1.73
EOSINOPHIL # BLD AUTO: 0.11 10*3/MM3 (ref 0–0.4)
EOSINOPHIL NFR BLD AUTO: 1.2 % (ref 0.3–6.2)
ERYTHROCYTE [DISTWIDTH] IN BLOOD BY AUTOMATED COUNT: 12.8 % (ref 12.3–15.4)
GLOBULIN UR ELPH-MCNC: 3.2 GM/DL
GLUCOSE SERPL-MCNC: 96 MG/DL (ref 65–99)
GLUCOSE UR STRIP-MCNC: NEGATIVE MG/DL
HBA1C MFR BLD: 5.4 % (ref 4.8–5.6)
HCT VFR BLD AUTO: 48.9 % (ref 37.5–51)
HDLC SERPL-MCNC: 30 MG/DL (ref 40–60)
HGB BLD-MCNC: 17.1 G/DL (ref 13–17.7)
HGB UR QL STRIP.AUTO: NEGATIVE
IMM GRANULOCYTES # BLD AUTO: 0.07 10*3/MM3 (ref 0–0.05)
IMM GRANULOCYTES NFR BLD AUTO: 0.8 % (ref 0–0.5)
KETONES UR QL STRIP: NEGATIVE
LDLC SERPL CALC-MCNC: 152 MG/DL (ref 0–100)
LDLC/HDLC SERPL: 4.99 {RATIO}
LEUKOCYTE ESTERASE UR QL STRIP.AUTO: NEGATIVE
LYMPHOCYTES # BLD AUTO: 2.57 10*3/MM3 (ref 0.7–3.1)
LYMPHOCYTES NFR BLD AUTO: 27.8 % (ref 19.6–45.3)
MCH RBC QN AUTO: 30.5 PG (ref 26.6–33)
MCHC RBC AUTO-ENTMCNC: 35 G/DL (ref 31.5–35.7)
MCV RBC AUTO: 87.3 FL (ref 79–97)
MONOCYTES # BLD AUTO: 0.83 10*3/MM3 (ref 0.1–0.9)
MONOCYTES NFR BLD AUTO: 9 % (ref 5–12)
NEUTROPHILS NFR BLD AUTO: 5.59 10*3/MM3 (ref 1.7–7)
NEUTROPHILS NFR BLD AUTO: 60.2 % (ref 42.7–76)
NITRITE UR QL STRIP: NEGATIVE
NRBC BLD AUTO-RTO: 0 /100 WBC (ref 0–0.2)
PH UR STRIP.AUTO: 5.5 [PH] (ref 5–8)
PLATELET # BLD AUTO: 300 10*3/MM3 (ref 140–450)
PMV BLD AUTO: 11.9 FL (ref 6–12)
POTASSIUM SERPL-SCNC: 4.3 MMOL/L (ref 3.5–5.2)
PROT SERPL-MCNC: 7.6 G/DL (ref 6–8.5)
PROT UR QL STRIP: NEGATIVE
RBC # BLD AUTO: 5.6 10*6/MM3 (ref 4.14–5.8)
SODIUM SERPL-SCNC: 136 MMOL/L (ref 136–145)
SP GR UR STRIP: 1.02 (ref 1–1.03)
TRIGL SERPL-MCNC: 121 MG/DL (ref 0–150)
TSH SERPL DL<=0.05 MIU/L-ACNC: 2.88 UIU/ML (ref 0.27–4.2)
UROBILINOGEN UR QL STRIP: ABNORMAL
VLDLC SERPL-MCNC: 22 MG/DL (ref 5–40)
WBC NRBC COR # BLD AUTO: 9.26 10*3/MM3 (ref 3.4–10.8)

## 2025-05-01 PROCEDURE — 81003 URINALYSIS AUTO W/O SCOPE: CPT

## 2025-05-01 PROCEDURE — 80061 LIPID PANEL: CPT

## 2025-05-01 PROCEDURE — 85025 COMPLETE CBC W/AUTO DIFF WBC: CPT

## 2025-05-01 PROCEDURE — 86141 C-REACTIVE PROTEIN HS: CPT

## 2025-05-01 PROCEDURE — 84443 ASSAY THYROID STIM HORMONE: CPT

## 2025-05-01 PROCEDURE — 80053 COMPREHEN METABOLIC PANEL: CPT

## 2025-05-01 PROCEDURE — 83036 HEMOGLOBIN GLYCOSYLATED A1C: CPT

## 2025-05-01 RX ORDER — SERTRALINE HYDROCHLORIDE 25 MG/1
25 TABLET, FILM COATED ORAL DAILY
Qty: 14 TABLET | Refills: 0 | Status: SHIPPED | OUTPATIENT
Start: 2025-05-01 | End: 2025-05-12

## 2025-05-02 LAB
QT INTERVAL: 308 MS
QTC INTERVAL: 424 MS

## 2025-05-04 ENCOUNTER — PATIENT MESSAGE (OUTPATIENT)
Dept: FAMILY MEDICINE CLINIC | Facility: CLINIC | Age: 20
End: 2025-05-04
Payer: COMMERCIAL

## 2025-05-04 DIAGNOSIS — F41.9 ANXIETY: Primary | ICD-10-CM

## 2025-05-12 ENCOUNTER — OFFICE VISIT (OUTPATIENT)
Dept: FAMILY MEDICINE CLINIC | Facility: CLINIC | Age: 20
End: 2025-05-12
Payer: COMMERCIAL

## 2025-05-12 VITALS
BODY MASS INDEX: 40.04 KG/M2 | HEIGHT: 71 IN | HEART RATE: 95 BPM | WEIGHT: 286 LBS | OXYGEN SATURATION: 97 % | SYSTOLIC BLOOD PRESSURE: 118 MMHG | DIASTOLIC BLOOD PRESSURE: 82 MMHG

## 2025-05-12 DIAGNOSIS — F41.9 ANXIETY: Primary | ICD-10-CM

## 2025-05-12 RX ORDER — HYDROXYZINE HYDROCHLORIDE 25 MG/1
25-50 TABLET, FILM COATED ORAL EVERY 8 HOURS PRN
Qty: 90 TABLET | Refills: 0 | Status: SHIPPED | OUTPATIENT
Start: 2025-05-12

## 2025-05-21 NOTE — PROGRESS NOTES
Established Patient Office Visit      Patient Name: Kole Sue  : 2005   MRN: [id number]   Care Team: Patient Care Team:  Chuy Oliver DO as PCP - General (Family Medicine)  Provider, No Known as PCP - Family Medicine    Chief Complaint:    Chief Complaint   Patient presents with    Anxiety     Wanting to discuss medications        History of Present Illness: Kole Sue is a 19 y.o. male who is here today for chief complaint.    HPI    Wants to discuss medications    This patient is accompanied by their mother who contributes to the history of their care.    The following portions of the patient's history were reviewed and updated as appropriate: allergies, current medications, past family history, past medical history, past social history, past surgical history and problem list.    Subjective      Review of Systems:   Review of Systems - See HPI    Past Medical History:   Past Medical History:   Diagnosis Date    Allergic N/A    Dairy, Tomatoes    Back pain at L4-L5 level     Depression 2018    Headache N/A    Irritable bowel syndrome 2016    Strep throat     Visual impairment     Glasses, Polar bear tracks       Past Surgical History:   Past Surgical History:   Procedure Laterality Date    CIRCUMCISION      COLONOSCOPY         Family History:   Family History   Problem Relation Age of Onset    Anxiety disorder Mother     Depression Mother     Anxiety disorder Maternal Grandmother     Arthritis Maternal Grandmother     Cancer Maternal Grandmother     Depression Maternal Grandmother     Diabetes Maternal Grandmother     Anxiety disorder Sister     Depression Sister     Anxiety disorder Sister     Depression Sister        Social History:   Social History     Socioeconomic History    Marital status: Single   Tobacco Use    Smoking status: Former     Current packs/day: 0.00     Average packs/day: 0.3 packs/day for 1 year (0.3 ttl pk-yrs)     Types: Cigarettes     Quit date: 3/15/2023      Years since quittin.1     Passive exposure: Yes    Smokeless tobacco: Never   Vaping Use    Vaping status: Former    Substances: Nicotine   Substance and Sexual Activity    Alcohol use: Never    Drug use: Never    Sexual activity: Never       Tobacco History:   Social History     Tobacco Use   Smoking Status Former    Current packs/day: 0.00    Average packs/day: 0.3 packs/day for 1 year (0.3 ttl pk-yrs)    Types: Cigarettes    Quit date: 3/15/2023    Years since quittin.1    Passive exposure: Yes   Smokeless Tobacco Never       Medications:   Outpatient Medications Prior to Visit   Medication Sig Dispense Refill    amitriptyline (ELAVIL) 75 MG tablet Take 1 tablet by mouth Every Night. 90 tablet 2    celecoxib (CeleBREX) 100 MG capsule TAKE 1 CAPSULE BY MOUTH 2 TIMES A  capsule 0    MAGIC MOUTHWASH  SUSP (diphenhydrAMINE HCl-Aluminum & Magnesium Hydroxide-Lidocaine) Swish and spit 10 mL 3 (Three) Times a Day As Needed for Mouth Pain. 250 mL 0    omeprazole (priLOSEC) 40 MG capsule Take 1 capsule by mouth Daily. 90 capsule 3    promethazine (PHENERGAN) 25 MG tablet Take 1 tablet by mouth Every 8 (Eight) Hours As Needed for Nausea or Vomiting. 15 tablet 0    SUMAtriptan (IMITREX) 100 MG tablet Take one tablet at onset of headache. May repeat dose one time in 2 hours if headache not relieved. 12 tablet 2    tiZANidine (ZANAFLEX) 4 MG tablet Take 2 tablets by mouth At Night As Needed for Muscle Spasms. 60 tablet 2    zolpidem (AMBIEN) 10 MG tablet Take 1 tablet by mouth At Night As Needed for Sleep. 30 tablet 2    hydrOXYzine (ATARAX) 25 MG tablet Take 1 tablet by mouth Every 8 (Eight) Hours As Needed for Anxiety. 10 tablet 0     No facility-administered medications prior to visit.        Allergies:   Allergies   Allergen Reactions    Lactose Other (See Comments)    Tomato Other (See Comments)     Canker sores, fever       Objective   Objective     Physical Exam:  Vital Signs:   Vitals:    25  "1453   BP: 128/86   Pulse: 100   SpO2: 97%   Weight: 130 kg (287 lb 3.2 oz)   Height: 180 cm (70.87\")     Body mass index is 40.21 kg/m².     Physical Exam  Nursing note reviewed  Const: NAD, A&Ox4, Pleasant, Cooperative  Eyes: EOMI, no conjunctivitis  ENT: No nasal discharge present, neck supple  Cardiac: Regular rate and rhythm, no cyanosis  Resp: Respiratory rate within normal limits, no increased work of breathing, no audible wheezing or retractions noted  GI: No distention or ascites  MSK: Motor and sensation grossly intact in bilateral upper extremities  Neurologic: CN II-XII grossly intact  Psych: Appropriate mood and behavior.  Skin: Warm, dry  Procedures/Radiology     Procedures  No radiology results for the last 7 days     Assessment & Plan   Assessment / Plan      Assessment/Plan:   Problems Addressed This Visit  Diagnoses and all orders for this visit:    1. Anxiety (Primary)  -     Discontinue: sertraline (Zoloft) 25 MG tablet; Take 1 tablet by mouth Daily for 14 days. Then start 50mg tablets  Dispense: 14 tablet; Refill: 0  -     Discontinue: sertraline (Zoloft) 50 MG tablet; Take 1 tablet by mouth Daily. Start after 25mg  Dispense: 30 tablet; Refill: 0      Problem List Items Addressed This Visit          Mental Health    Anxiety - Primary     We will start him on sertraline 25 mg daily.  After 2 weeks he can increase to 50 mg.  New prescription sent to the pharmacy today.  He can continue to use the hydroxyzine as needed since that seemed to help him in the ER    No orders of the defined types were placed in this encounter.       There are no Patient Instructions on file for this visit.    Follow Up:   Return in about 1 month (around 6/1/2025) for video visit, anxiety.        DO KIERSTEN Parnell RD  [facility]  [address]  [facility] [address]  Fax [phone]  Phone [phone]    Disclaimer to patients: The 21st " Century Cares Act makes medical notes like these available to patients in the interest of transparency. However, please be advised that this is still a medical document. It is intended as [id number] communication. Many sections may include medical language or jargon, abbreviations, and additional verbiage that are unfamiliar or confusing. In some ways it may come across as blunt, direct, or may be summarized in order to clearly and concisely communicate the most crucial information to medical professionals. It may also include mentions of conditions that are unlikely but considered as part of the differential diagnosis, including serious disorders. These are not always discussed at length at the time of appointment because their likelihood is so low, but may be included in a medical note to make it clear what has been considered and/or ruled out as part of a work-up. Medical documents are intended to carry relevant information, facts as evident, and the personal clinical opinion of the physician. If you have any questions regarding this medical document, please bring them to the attention of the physician at your next scheduled appointment.

## 2025-06-02 NOTE — PROGRESS NOTES
Established Patient Office Visit      Patient Name: Kole Sue  : 2005   MRN: [id number]   Care Team: Patient Care Team:  Chuy Oliver DO as PCP - General (Family Medicine)  Provider, No Known as PCP - Family Medicine    Chief Complaint:    Chief Complaint   Patient presents with    Anxiety     Follow up       History of Present Illness: Kole Sue is a 19 y.o. male who is here today for chief complaint.    HPI    He reports he feels much better on sertraline.  He is in need of a refill.  Denies any side effect.    This patient is accompanied by their mother who contributes to the history of their care.    The following portions of the patient's history were reviewed and updated as appropriate: allergies, current medications, past family history, past medical history, past social history, past surgical history and problem list.    Subjective      Review of Systems:   Review of Systems - See HPI    Past Medical History:   Past Medical History:   Diagnosis Date    Allergic N/A    Dairy, Tomatoes    Back pain at L4-L5 level     Depression 2018    Headache N/A    Irritable bowel syndrome 2016    Strep throat     Visual impairment     Glasses, Polar bear tracks       Past Surgical History:   Past Surgical History:   Procedure Laterality Date    CIRCUMCISION      COLONOSCOPY         Family History:   Family History   Problem Relation Age of Onset    Anxiety disorder Mother     Depression Mother     Anxiety disorder Maternal Grandmother     Arthritis Maternal Grandmother     Cancer Maternal Grandmother     Depression Maternal Grandmother     Diabetes Maternal Grandmother     Anxiety disorder Sister     Depression Sister     Anxiety disorder Sister     Depression Sister        Social History:   Social History     Socioeconomic History    Marital status: Single   Tobacco Use    Smoking status: Former     Current packs/day: 0.00     Average packs/day: 0.3 packs/day for 1 year (0.3 ttl pk-yrs)      Types: Cigarettes     Quit date: 3/15/2023     Years since quittin.2     Passive exposure: Yes    Smokeless tobacco: Never   Vaping Use    Vaping status: Former    Substances: Nicotine   Substance and Sexual Activity    Alcohol use: Never    Drug use: Never    Sexual activity: Never       Tobacco History:   Social History     Tobacco Use   Smoking Status Former    Current packs/day: 0.00    Average packs/day: 0.3 packs/day for 1 year (0.3 ttl pk-yrs)    Types: Cigarettes    Quit date: 3/15/2023    Years since quittin.2    Passive exposure: Yes   Smokeless Tobacco Never       Medications:   Outpatient Medications Prior to Visit   Medication Sig Dispense Refill    amitriptyline (ELAVIL) 75 MG tablet Take 1 tablet by mouth Every Night. 90 tablet 2    celecoxib (CeleBREX) 100 MG capsule TAKE 1 CAPSULE BY MOUTH 2 TIMES A  capsule 0    hydrOXYzine (ATARAX) 25 MG tablet Take 1-2 tablets by mouth Every 8 (Eight) Hours As Needed for Anxiety. 90 tablet 0    MAGIC MOUTHWASH  SUSP (diphenhydrAMINE HCl-Aluminum & Magnesium Hydroxide-Lidocaine) Swish and spit 10 mL 3 (Three) Times a Day As Needed for Mouth Pain. 250 mL 0    omeprazole (priLOSEC) 40 MG capsule Take 1 capsule by mouth Daily. 90 capsule 3    promethazine (PHENERGAN) 25 MG tablet Take 1 tablet by mouth Every 8 (Eight) Hours As Needed for Nausea or Vomiting. 15 tablet 0    SUMAtriptan (IMITREX) 100 MG tablet Take one tablet at onset of headache. May repeat dose one time in 2 hours if headache not relieved. 12 tablet 2    tiZANidine (ZANAFLEX) 4 MG tablet Take 2 tablets by mouth At Night As Needed for Muscle Spasms. 60 tablet 2    zolpidem (AMBIEN) 10 MG tablet Take 1 tablet by mouth At Night As Needed for Sleep. 30 tablet 2    sertraline (Zoloft) 25 MG tablet Take 1 tablet by mouth Daily for 14 days. Then start 50mg tablets 14 tablet 0    sertraline (Zoloft) 50 MG tablet Take 1 tablet by mouth Daily. Start after 25mg 30 tablet 0     No  "facility-administered medications prior to visit.        Allergies:   Allergies   Allergen Reactions    Lactose Other (See Comments)    Tomato Other (See Comments)     Canker sores, fever       Objective   Objective     Physical Exam:  Vital Signs:   Vitals:    05/12/25 1308   BP: 118/82   Pulse: 95   SpO2: 97%   Weight: 130 kg (286 lb)   Height: 180 cm (70.87\")     Body mass index is 40.04 kg/m².     Physical Exam  Nursing note reviewed  Const: NAD, A&Ox4, Pleasant, Cooperative  Eyes: EOMI, no conjunctivitis  ENT: No nasal discharge present, neck supple  Cardiac: Regular rate and rhythm, no cyanosis  Resp: Respiratory rate within normal limits, no increased work of breathing, no audible wheezing or retractions noted  GI: No distention or ascites  MSK: Motor and sensation grossly intact in bilateral upper extremities  Neurologic: CN II-XII grossly intact  Psych: Appropriate mood and behavior.  Skin: Warm, dry  Procedures/Radiology     Procedures  No radiology results for the last 7 days     Assessment & Plan   Assessment / Plan      Assessment/Plan:   Problems Addressed This Visit  Diagnoses and all orders for this visit:    1. Anxiety (Primary)  -     sertraline (Zoloft) 50 MG tablet; Take 1 tablet by mouth Daily. Start after 25mg  Dispense: 90 tablet; Refill: 1      Problem List Items Addressed This Visit          Mental Health    Anxiety - Primary    Relevant Medications    sertraline (Zoloft) 50 MG tablet       New Medications Ordered This Visit   Medications    sertraline (Zoloft) 50 MG tablet     Sig: Take 1 tablet by mouth Daily. Start after 25mg     Dispense:  90 tablet     Refill:  1        Patient Instructions   Increase to 50mg sertraline  Follow up in 6 months or sooner if needed    Follow Up:   Return in about 6 months (around 11/12/2025) for anxiety.      DO KIERSTEN Parnell RD  [facility]  [address]  Former[facility] [place] " [phone]  Fax [phone]  Phone [phone]    Disclaimer to patients: The 21[employer] Century C[employer] Act makes medical notes like these available to patients in the interest of transparency. However, please be advised that this is still a medical document. It is intended as [id number] communication. Many sections may include medical language or jargon, abbreviations, and additional verbiage that are unfamiliar or confusing. In some ways it may come across as blunt, direct, or may be summarized in order to clearly and concisely communicate the most crucial information to medical professionals. It may also include mentions of conditions that are unlikely but considered as part of the differential diagnosis, including serious disorders. These are not always discussed at length at the time of appointment because their likelihood is so low, but may be included in a medical note to make it clear what has been considered and/or ruled out as part of a work-up. Medical documents are intended to carry relevant information, facts as evident, and the personal clinical opinion of the physician. If you have any questions regarding this medical document, please bring them to the attention of the physician at your next scheduled appointment.

## 2025-06-12 DIAGNOSIS — G89.29 CHRONIC BILATERAL LOW BACK PAIN WITHOUT SCIATICA: ICD-10-CM

## 2025-06-12 DIAGNOSIS — M54.50 CHRONIC BILATERAL LOW BACK PAIN WITHOUT SCIATICA: ICD-10-CM

## 2025-06-12 RX ORDER — CELECOXIB 100 MG/1
100 CAPSULE ORAL EVERY 12 HOURS SCHEDULED
Qty: 180 CAPSULE | Refills: 1 | Status: SHIPPED | OUTPATIENT
Start: 2025-06-12

## 2025-06-12 NOTE — TELEPHONE ENCOUNTER
Rx Refill Note  Requested Prescriptions     Pending Prescriptions Disp Refills    celecoxib (CeleBREX) 100 MG capsule [Pharmacy Med Name: CELECOXIB 100 MG CAPSULE] 180 capsule 0     Sig: TAKE 1 CAPSULE BY MOUTH 2 TIMES A DAY      Last office visit with prescribing clinician: 5/12/2025   Last telemedicine visit with prescribing clinician: Visit date not found   Next office visit with prescribing clinician: Visit date not found                         Would you like a call back once the refill request has been completed: [] Yes [] No    If the office needs to give you a call back, can they leave a voicemail: [] Yes [] No    Odalys Vu MA  06/12/25, 11:21 EDT

## 2025-07-11 ENCOUNTER — TELEPHONE (OUTPATIENT)
Dept: FAMILY MEDICINE CLINIC | Facility: CLINIC | Age: 20
End: 2025-07-11

## 2025-07-18 ENCOUNTER — PATIENT MESSAGE (OUTPATIENT)
Dept: FAMILY MEDICINE CLINIC | Facility: CLINIC | Age: 20
End: 2025-07-18
Payer: COMMERCIAL

## 2025-07-18 DIAGNOSIS — F41.9 ANXIETY: ICD-10-CM

## 2025-07-21 DIAGNOSIS — F41.9 ANXIETY: ICD-10-CM

## 2025-07-22 RX ORDER — HYDROXYZINE HYDROCHLORIDE 25 MG/1
TABLET, FILM COATED ORAL
Qty: 180 TABLET | Refills: 0 | Status: SHIPPED | OUTPATIENT
Start: 2025-07-22

## 2025-07-22 RX ORDER — HYDROXYZINE HYDROCHLORIDE 25 MG/1
25-50 TABLET, FILM COATED ORAL EVERY 8 HOURS PRN
Qty: 90 TABLET | Refills: 0 | Status: SHIPPED | OUTPATIENT
Start: 2025-07-22

## 2025-07-22 NOTE — TELEPHONE ENCOUNTER
Rx Refill Note  Requested Prescriptions     Pending Prescriptions Disp Refills    hydrOXYzine (ATARAX) 25 MG tablet 90 tablet 0     Sig: Take 1-2 tablets by mouth Every 8 (Eight) Hours As Needed for Anxiety.      Last office visit with prescribing clinician: 5/12/2025   Last telemedicine visit with prescribing clinician: Visit date not found   Next office visit with prescribing clinician: 7/21/2025                         Would you like a call back once the refill request has been completed: [] Yes [] No    If the office needs to give you a call back, can they leave a voicemail: [] Yes [] No    Khalida Beasley MA  07/22/25, 09:53 EDT

## 2025-08-03 RX ORDER — SERTRALINE HYDROCHLORIDE 100 MG/1
100 TABLET, FILM COATED ORAL DAILY
Qty: 90 TABLET | Refills: 0 | Status: SHIPPED | OUTPATIENT
Start: 2025-08-03

## 2025-08-15 DIAGNOSIS — F51.01 PRIMARY INSOMNIA: ICD-10-CM

## 2025-08-18 RX ORDER — ZOLPIDEM TARTRATE 10 MG/1
10 TABLET ORAL NIGHTLY PRN
Qty: 30 TABLET | Refills: 2 | Status: SHIPPED | OUTPATIENT
Start: 2025-08-18

## 2025-08-21 ENCOUNTER — HOSPITAL ENCOUNTER (EMERGENCY)
Facility: HOSPITAL | Age: 20
Discharge: HOME | End: 2025-08-22
Attending: EMERGENCY MEDICINE
Payer: COMMERCIAL